# Patient Record
Sex: MALE | Race: OTHER
[De-identification: names, ages, dates, MRNs, and addresses within clinical notes are randomized per-mention and may not be internally consistent; named-entity substitution may affect disease eponyms.]

---

## 2023-09-29 ENCOUNTER — NON-APPOINTMENT (OUTPATIENT)
Age: 58
End: 2023-09-29

## 2023-09-29 ENCOUNTER — APPOINTMENT (OUTPATIENT)
Dept: VASCULAR SURGERY | Facility: CLINIC | Age: 58
End: 2023-09-29
Payer: MEDICAID

## 2023-09-29 VITALS
HEIGHT: 74 IN | DIASTOLIC BLOOD PRESSURE: 82 MMHG | WEIGHT: 184 LBS | TEMPERATURE: 97.6 F | SYSTOLIC BLOOD PRESSURE: 150 MMHG | BODY MASS INDEX: 23.61 KG/M2 | HEART RATE: 75 BPM | OXYGEN SATURATION: 95 %

## 2023-09-29 DIAGNOSIS — R60.9 EDEMA, UNSPECIFIED: ICD-10-CM

## 2023-09-29 DIAGNOSIS — Z86.79 PERSONAL HISTORY OF OTHER DISEASES OF THE CIRCULATORY SYSTEM: ICD-10-CM

## 2023-09-29 DIAGNOSIS — I10 ESSENTIAL (PRIMARY) HYPERTENSION: ICD-10-CM

## 2023-09-29 DIAGNOSIS — Z87.891 PERSONAL HISTORY OF NICOTINE DEPENDENCE: ICD-10-CM

## 2023-09-29 DIAGNOSIS — Z87.81 PERSONAL HISTORY OF (HEALED) TRAUMATIC FRACTURE: ICD-10-CM

## 2023-09-29 PROBLEM — Z00.00 ENCOUNTER FOR PREVENTIVE HEALTH EXAMINATION: Status: ACTIVE | Noted: 2023-09-29

## 2023-09-29 PROCEDURE — 99205 OFFICE O/P NEW HI 60 MIN: CPT

## 2023-09-29 RX ORDER — LISINOPRIL 40 MG/1
40 TABLET ORAL
Refills: 0 | Status: ACTIVE | COMMUNITY

## 2023-10-20 ENCOUNTER — APPOINTMENT (OUTPATIENT)
Dept: VASCULAR SURGERY | Facility: CLINIC | Age: 58
End: 2023-10-20

## 2024-08-02 ENCOUNTER — EMERGENCY (EMERGENCY)
Facility: HOSPITAL | Age: 59
LOS: 0 days | Discharge: HOME | End: 2024-08-02
Attending: EMERGENCY MEDICINE
Payer: MEDICAID

## 2024-08-02 ENCOUNTER — TRANSCRIPTION ENCOUNTER (OUTPATIENT)
Age: 59
End: 2024-08-02

## 2024-08-02 VITALS
HEART RATE: 86 BPM | HEIGHT: 74 IN | TEMPERATURE: 98 F | DIASTOLIC BLOOD PRESSURE: 84 MMHG | OXYGEN SATURATION: 96 % | SYSTOLIC BLOOD PRESSURE: 138 MMHG | RESPIRATION RATE: 15 BRPM | WEIGHT: 186.95 LBS

## 2024-08-02 VITALS
RESPIRATION RATE: 15 BRPM | OXYGEN SATURATION: 97 % | HEART RATE: 57 BPM | DIASTOLIC BLOOD PRESSURE: 82 MMHG | SYSTOLIC BLOOD PRESSURE: 131 MMHG | TEMPERATURE: 98 F
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
## 2024-08-02 DIAGNOSIS — Z87.81 PERSONAL HISTORY OF (HEALED) TRAUMATIC FRACTURE: ICD-10-CM

## 2024-08-02 DIAGNOSIS — S50.02XA CONTUSION OF LEFT ELBOW, INITIAL ENCOUNTER: ICD-10-CM
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
## 2024-08-02 DIAGNOSIS — M25.522 PAIN IN LEFT ELBOW: ICD-10-CM

## 2024-08-02 DIAGNOSIS — S83.92XA SPRAIN OF UNSPECIFIED SITE OF LEFT KNEE, INITIAL ENCOUNTER: ICD-10-CM

## 2024-08-02 DIAGNOSIS — M25.562 PAIN IN LEFT KNEE: ICD-10-CM

## 2024-08-02 PROCEDURE — 73564 X-RAY EXAM KNEE 4 OR MORE: CPT | Mod: 26,LT

## 2024-08-02 PROCEDURE — 73080 X-RAY EXAM OF ELBOW: CPT | Mod: 26,LT

## 2024-08-02 PROCEDURE — 99284 EMERGENCY DEPT VISIT MOD MDM: CPT

## 2024-08-02 RX ORDER — ACETAMINOPHEN 325 MG
650 TABLET ORAL ONCE
Refills: 0 | Status: COMPLETED | OUTPATIENT
Start: 2024-08-02 | End: 2024-08-02

## 2024-08-02 RX ORDER — LIDOCAINE HCL 28 MG/G
1 GEL TOPICAL ONCE
Refills: 0 | Status: COMPLETED | OUTPATIENT
Start: 2024-08-02 | End: 2024-08-02

## 2024-08-02 RX ORDER — LIDOCAINE HCL 28 MG/G
1 GEL TOPICAL
Qty: 5 | Refills: 0
Start: 2024-08-02 | End: 2024-08-06

## 2024-08-02 RX ADMIN — LIDOCAINE HCL 1 PATCH: 28 GEL TOPICAL at 08:09

## 2024-08-02 RX ADMIN — Medication 650 MILLIGRAM(S): at 08:10

## 2024-08-02 NOTE — ED ADULT NURSE NOTE - NSFALLHARMRISKINTERV_ED_ALL_ED

## 2024-08-02 NOTE — ED PROVIDER NOTE - NSFOLLOWUPINSTRUCTIONS_ED_ALL_ED_FT
Knee Sprain, Adult  A person's knee joint, with a close-up of the ligament.  A knee sprain is a stretch or tear in a knee ligament. Knee ligaments are tissues that connect the bones of the knee joint to each other.    What are the causes?  This condition often results from:  A fall.  An injury to the knee.  What are the signs or symptoms?  Symptoms of this condition include:  Trouble straightening or bending the leg.  Swelling in the knee.  Bruising around the knee.  Tenderness or pain in the knee.  Sudden muscle tightening (spasm) around the knee.  How is this diagnosed?  This condition may be diagnosed based on:  A physical exam.  A history of what happened just before you started to have symptoms.  Tests. These may include:  An X-ray. This may be done to make sure no bones are broken or moved out of position (dislocated).  An MRI. This may be done to check if any of the ligaments are torn and to check for other damage.  A physical exam that includes stress testing of the knee. This may be done to check for damage to ligaments.  How is this treated?  Treatment for this condition may involve:  Keeping the knee in a straightened position (immobilized) with a cast, brace, or splint.  Applying ice to the knee. This helps with pain and swelling.  Raising (elevating) the knee above the level of your heart when you are resting. This helps with pain and swelling.  Taking medicine for pain.  Doing exercises to prevent or limit long-term weakness or stiffness in your knee.  Having surgery to reconnect ligaments to the bone or to reconstruct ligaments. This may be needed if one or more ligaments are fully torn.  Follow these instructions at home:  If you have a removable splint or brace:    Wear the splint or brace as told by your health care provider. Remove it only as told by your health care provider.  Check the skin around the splint or brace every day. Tell your health care provider about any concerns.  Loosen the splint or brace if your toes tingle, become numb, or turn cold and blue.  Keep the splint or brace clean and dry.  If you have a nonremovable cast:    Do not put pressure on any part of the cast until it is fully hardened. This may take several hours.  Do not stick anything inside the cast to scratch your skin. Doing that increases your risk of infection.  Check the skin around the cast every day. Tell your health care provider about any concerns.  You may put lotion on dry skin around the edges of the cast. Do not put lotion on the skin underneath the cast.  Keep the cast clean and dry.  Bathing    If the splint, brace, or cast is not waterproof:  Do not let it get wet.  Cover it with a watertight covering when you take a bath or a shower.  Managing pain, stiffness, and swelling    A bag of ice on a towel on the skin.   If directed, put ice on the injured area. To do this:  If you have a removable splint or brace, remove it as told by your health care provider.  Put ice in a plastic bag.  Place a towel between your skin and the bag or between your cast and the bag.  Leave the ice on for 20 minutes, 2–3 times a day.  If your skin turns bright red, remove the ice right away to prevent skin damage. The risk of skin damage is higher if you cannot feel pain, heat, or cold.  Move your toes often to reduce stiffness and swelling.  Elevate the injured area above the level of your heart while you are sitting or lying down.  General instructions    Take over-the-counter and prescription medicines only as told by your health care provider.  Do not use any products that contain nicotine or tobacco. These products include cigarettes, chewing tobacco, and vaping devices, such as e-cigarettes. These can delay healing. If you need help quitting, ask your health care provider.  Do exercises as told by your health care provider.  Contact a health care provider if:  You have pain that gets worse.  The cast, brace, or splint does not fit right or gets damaged.  Get help right away if:  You cannot use your injured knee to support any of your body weight (cannot bear weight).  You cannot move the injured joint.  You cannot walk more than a few steps without pain or without your knee buckling.  You have a lot of pain, swelling, or numbness in the leg below the cast, brace, or splint.  Your foot or toes are numb, cold, or blue after you loosen your splint or brace.  This information is not intended to replace advice given to you by your health care provider. Make sure you discuss any questions you have with your health care provider.        Elbow Contusion  An elbow contusion is a deep bruise of the elbow. Contusions happen when an injury causes bleeding under the skin. The skin over the contusion may change color as time passes.    You may get a painless contusion from a minor injury, but more severe contusions may be painful and swollen for a few weeks.    What are the causes?  This condition is caused by an injury to the elbow, such as:  A hard hit to the elbow.  A fall onto the elbow.  What increases the risk?  You are more likely to develop this condition if you:  Play sports or do other physical activities.  Take blood thinners.  Fall often.  What are the signs or symptoms?  Symptoms of this condition include:  Swelling of the elbow.  Pain and tenderness of the elbow.  Change in skin color at the elbow. The area may have redness and then change color as time passes.  How is this diagnosed?  This condition is diagnosed based on:  Your symptoms and medical history.  A physical exam.  You may also need an X-ray to see if there are any other injuries, such as broken bones (fractures).    An MRI might be done if the swelling and pain do not go away in a few weeks.    How is this treated?  This condition may be treated with:  Rest, ice, pressure (compression), and elevation. This is often called RICE therapy. In general, this is considered the best treatment for this condition.  A sling or splint to support your injury.  Over-the-counter anti-inflammatory medicines, such as ibuprofen, to help with pain.  Range-of-motion exercises.  Follow these instructions at home:  RICE therapy    A person holding an ice pack on an injured elbow.  Rest the injured area as told by your health care provider.  If told, put ice on the injured area:  If you have a removable sling or splint, remove it as told by your provider.  Put ice in a plastic bag.  Place a towel between your skin and the bag.  Leave the ice on for 20 minutes, 2–3 times a day.  If your skin turns bright red, remove the ice right away to prevent skin damage. The risk of damage is higher if you cannot feel pain, heat, or cold.  Move your fingers often to reduce stiffness and swelling.  If told, apply light compression to the injured area using an elastic bandage. Make sure the bandage is not wrapped too tightly. Remove the bandage and put it back on as told by your provider.  Raise (elevate) the injured area above the level of your heart while you are sitting or lying down.  If you have a removable sling or splint:    A sling on a person's arm.  Wear the sling or splint as told by your provider. Remove it only as told by your provider.  Check the skin around the sling or splint every day. Tell your provider about any concerns.  Loosen the sling or splint if your fingers tingle, become numb, or turn cold and blue.  Keep the sling or splint clean and dry.  If the sling or splint is not waterproof:  Do not let it get wet.  Cover it with a watertight covering when you take a bath or a shower.  General instructions    Take over-the-counter and prescription medicines only as told by your provider.  Return to your normal activities as told by your provider. Ask your provider what activities are safe for you.  Do range-of-motion exercises only as told by your provider.  Ask your provider when it is safe to drive if you have a sling or splint on your arm.  Wear elbow pads as told by your provider.  Keep all follow-up visits. Your provider will need to see how you are healing.  Contact a health care provider if:  Your symptoms do not improve after several days of treatment.  You have more redness, swelling, or pain in your elbow.  You have difficulty moving the injured area.  Your swelling or pain is not relieved with medicines.  Get help right away if:  Your skin over the contusion breaks and starts bleeding.  You have severe pain.  You have numbness in your hand or fingers.  Your hand or fingers turn pale or cold.  You have swelling of your hand and fingers.  You cannot move your fingers or wrist.  This information is not intended to replace advice given to you by your health care provider. Make sure you discuss any questions you have with your health care provider.
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
## 2024-08-02 NOTE — ED PROVIDER NOTE - CARE PROVIDER_API CALL
Adam Patrick  Orthopaedic Surgery  [address], [address]  [place] [address]  Phone: [phone]  Fax: [phone]  Follow Up Time: 1-3 Days

## 2024-08-02 NOTE — ED PROVIDER NOTE - CARDIAC, MLM
09/13/13/2022  12:00   am/елена
Normal rate, regular rhythm.  Heart sounds S1, S2.  No murmurs, rubs or gallops.

## 2024-08-02 NOTE — ED PROVIDER NOTE - MUSCULOSKELETAL MINIMAL EXAM
left elbow ROM intact, focal tenderness on lateral aspect of olecranon, no significant swelling noted. Left knee with no effusion, limited at baseline to flexion/extension due to prior surgeries. mild swelling laterally but no erythema.

## 2024-08-02 NOTE — ED PROVIDER NOTE - WR INTERPRETATION 1
abnormal left knee - without joint spaces noted - likely secondary to prior surgical hx- however no acute fx or dislocation notable.

## 2024-08-02 NOTE — ED PROVIDER NOTE - PATIENT PORTAL LINK FT
You can access the FollowMyHealth Patient Portal offered by [facility] by registering at the following website: [email or web address]. By joining [facility]’s FollowMyHealth portal, you will also be able to view your health information using other applications (apps) compatible with our system.
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
## 2024-08-15 ENCOUNTER — NON-APPOINTMENT (OUTPATIENT)
Age: 59
End: 2024-08-15

## 2024-08-15 ENCOUNTER — APPOINTMENT (OUTPATIENT)
Dept: CARDIOLOGY | Facility: CLINIC | Age: 59
End: 2024-08-15
Payer: COMMERCIAL

## 2024-08-15 VITALS
WEIGHT: 190 LBS | SYSTOLIC BLOOD PRESSURE: 124 MMHG | HEIGHT: 74 IN | DIASTOLIC BLOOD PRESSURE: 74 MMHG | OXYGEN SATURATION: 98 % | HEART RATE: 64 BPM | BODY MASS INDEX: 24.38 KG/M2

## 2024-08-15 DIAGNOSIS — E78.49 OTHER HYPERLIPIDEMIA: ICD-10-CM

## 2024-08-15 DIAGNOSIS — R00.2 PALPITATIONS: ICD-10-CM

## 2024-08-15 DIAGNOSIS — I10 ESSENTIAL (PRIMARY) HYPERTENSION: ICD-10-CM

## 2024-08-15 PROCEDURE — G2211 COMPLEX E/M VISIT ADD ON: CPT | Mod: NC,1L

## 2024-08-15 PROCEDURE — 93000 ELECTROCARDIOGRAM COMPLETE: CPT

## 2024-08-15 PROCEDURE — 93242 EXT ECG>48HR<7D RECORDING: CPT

## 2024-08-15 PROCEDURE — 99203 OFFICE O/P NEW LOW 30 MIN: CPT | Mod: 25

## 2024-08-15 NOTE — HISTORY OF PRESENT ILLNESS
[FreeTextEntry1] : PCP - Dr Edgar Whitlock   58M with HTN, borderline lipids who presents with palpitations  Admits to intermittent palpitations for the past several months Occurs on average once a week lasting only a few seconds  No identifiable stresses, drinks 1 cup of coffee per day Denies any CP or dyspnea  A1c 5.5%,   Never smoker. Denies FHx of CAD. Employed as a  for [employer].   ECG: SR, no ST-T wave changes  Lisinopril 40mg

## 2024-08-15 NOTE — DISCUSSION/SUMMARY
[EKG obtained to assist in diagnosis and management of assessed problem(s)] : EKG obtained to assist in diagnosis and management of assessed problem(s) [FreeTextEntry1] : 58M HTN, borderline lipids with palpitations  HTN: Well controlled, continue lisinopril 40mg. Low sodium diet.   Borderline lipids:  - encouraged aggressive lifestyle modifications, can consider CAC in future  Palpitations: Will arrange for transthoracic echo to ensure normal left ventricular size and function and normal valvular function. ZIO x 7D to assess for arrhythmias. Patient educated on nature and etiology of palpitations. Advised to limit caffeine intake and increase hydration

## 2024-08-27 ENCOUNTER — NON-APPOINTMENT (OUTPATIENT)
Age: 59
End: 2024-08-27

## 2024-08-28 ENCOUNTER — APPOINTMENT (OUTPATIENT)
Dept: ORTHOPEDIC SURGERY | Facility: CLINIC | Age: 59
End: 2024-08-28

## 2024-08-28 VITALS — BODY MASS INDEX: 24.64 KG/M2 | WEIGHT: 192 LBS | HEIGHT: 74 IN

## 2024-08-28 DIAGNOSIS — M24.662 ANKYLOSIS, LEFT KNEE: ICD-10-CM

## 2024-08-28 PROCEDURE — 93244 EXT ECG>48HR<7D REV&INTERPJ: CPT

## 2024-08-28 PROCEDURE — 99204 OFFICE O/P NEW MOD 45 MIN: CPT

## 2024-08-28 PROCEDURE — 73590 X-RAY EXAM OF LOWER LEG: CPT | Mod: LT

## 2024-08-28 PROCEDURE — 73552 X-RAY EXAM OF FEMUR 2/>: CPT | Mod: LT

## 2024-09-06 PROBLEM — M24.662 ANKYLOSIS OF LEFT KNEE JOINT: Status: ACTIVE | Noted: 2024-09-06

## 2024-09-06 NOTE — DISCUSSION/SUMMARY
[de-identified] : 58-year-old male with left knee bony fusion related to multiple prior surgeries including femur fracture with infection and removal of hardware.  He is a poor historian regarding his surgical history.  He inquired about regaining motion with total knee arthroplasty surgery.  I discussed this with him.  There are significant risks in this case including taking down the fusion, infection, muscle contracture.  He is not a surgical candidate and surgery is not warranted.  I recommended a second opinion with [facility] or [place].

## 2024-09-06 NOTE — HISTORY OF PRESENT ILLNESS
[de-identified] : 58-year-old male with chronic left knee pain and deformity.  Reports left femur fracture several years ago in [place] treated with intramedullary nail.  This became infected and he had multiple additional surgeries of his femur including removal of hardware.  In 1987 he had a fusion of his knee and is unsure what surgery was performed.  He does not know the specifics of any surgeries.  He reports inability to bend his knee.  Approximately 1 month ago he developed pain laterally and went to the ER to obtain x-rays.  This pain has resolved.  He wears a knee brace.

## 2024-09-06 NOTE — PHYSICAL EXAM
[de-identified] : X-rays from the emergency department on 8/2/2024 shows bridging bone across the knee joint, no fracture.  Left femur and left tibia x-rays today shows deformity at the femoral diaphysis with callus and narrowing of the femoral canal, significant narrowing of the hip joint, no fracture in the tibia or fibula.  Ankle joint space maintained. [de-identified] : General: No acute distress Mental: Alert and oriented x3 Eyes: Conjunctivitis not seen Chest: Symmetric chest rise, no audible wheezing Skin: Bilateral lower extremities absent from rashes and ulcers Abdomen: No distention  Left knee: Skin: Anteromedial and anterolateral incisions, dimpling anterior thigh, large lateral incision along the length of the thigh Inspection: Quad atrophy, mild knee swelling, no erythema, no ecchymosis Tenderness: no MJLT. no LJLT. No tenderness over the medial and lateral patella facets. No ttp medial/lateral epicondyle, patella tendon, tibial tubercle, pes anserinus, or proximal fibula.  ROM: Locked in extension, no motion Stability: Stable to varus and valgus Strength: TA/GS/EHL Neuro: Sensation intact to light touch throughout in dp/sp/tib/luc/saph nerve distributions Pulses: 2+ DP/PT pulses.

## 2024-09-06 NOTE — PHYSICAL EXAM
[de-identified] : X-rays from the emergency department on 8/2/2024 shows bridging bone across the knee joint, no fracture.  Left femur and left tibia x-rays today shows deformity at the femoral diaphysis with callus and narrowing of the femoral canal, significant narrowing of the hip joint, no fracture in the tibia or fibula.  Ankle joint space maintained. [de-identified] : General: No acute distress Mental: Alert and oriented x3 Eyes: Conjunctivitis not seen Chest: Symmetric chest rise, no audible wheezing Skin: Bilateral lower extremities absent from rashes and ulcers Abdomen: No distention  Left knee: Skin: Anteromedial and anterolateral incisions, dimpling anterior thigh, large lateral incision along the length of the thigh Inspection: Quad atrophy, mild knee swelling, no erythema, no ecchymosis Tenderness: no MJLT. no LJLT. No tenderness over the medial and lateral patella facets. No ttp medial/lateral epicondyle, patella tendon, tibial tubercle, pes anserinus, or proximal fibula.  ROM: Locked in extension, no motion Stability: Stable to varus and valgus Strength: TA/GS/EHL Neuro: Sensation intact to light touch throughout in dp/sp/tib/luc/saph nerve distributions Pulses: 2+ DP/PT pulses.

## 2024-09-06 NOTE — HISTORY OF PRESENT ILLNESS
[de-identified] : 58-year-old male with chronic left knee pain and deformity.  Reports left femur fracture several years ago in [place] treated with intramedullary nail.  This became infected and he had multiple additional surgeries of his femur including removal of hardware.  In 1987 he had a fusion of his knee and is unsure what surgery was performed.  He does not know the specifics of any surgeries.  He reports inability to bend his knee.  Approximately 1 month ago he developed pain laterally and went to the ER to obtain x-rays.  This pain has resolved.  He wears a knee brace.

## 2024-09-06 NOTE — DISCUSSION/SUMMARY
[de-identified] : 58-year-old male with left knee bony fusion related to multiple prior surgeries including femur fracture with infection and removal of hardware.  He is a poor historian regarding his surgical history.  He inquired about regaining motion with total knee arthroplasty surgery.  I discussed this with him.  There are significant risks in this case including taking down the fusion, infection, muscle contracture.  He is not a surgical candidate and surgery is not warranted.  I recommended a second opinion with [facility] or [place].

## 2024-09-27 ENCOUNTER — APPOINTMENT (OUTPATIENT)
Dept: INTERNAL MEDICINE | Facility: CLINIC | Age: 59
End: 2024-09-27
Payer: COMMERCIAL

## 2024-09-27 PROCEDURE — 93306 TTE W/DOPPLER COMPLETE: CPT
